# Patient Record
Sex: FEMALE | Race: WHITE
[De-identification: names, ages, dates, MRNs, and addresses within clinical notes are randomized per-mention and may not be internally consistent; named-entity substitution may affect disease eponyms.]

---

## 2018-02-12 ENCOUNTER — HOSPITAL ENCOUNTER (EMERGENCY)
Dept: HOSPITAL 45 - C.EDB | Age: 18
Discharge: HOME | End: 2018-02-12
Payer: COMMERCIAL

## 2018-02-12 VITALS
HEIGHT: 65.98 IN | WEIGHT: 186.95 LBS | BODY MASS INDEX: 30.05 KG/M2 | HEIGHT: 65.98 IN | WEIGHT: 186.95 LBS | BODY MASS INDEX: 30.05 KG/M2

## 2018-02-12 VITALS — HEART RATE: 79 BPM | DIASTOLIC BLOOD PRESSURE: 73 MMHG | SYSTOLIC BLOOD PRESSURE: 95 MMHG | OXYGEN SATURATION: 96 %

## 2018-02-12 VITALS — TEMPERATURE: 98.42 F

## 2018-02-12 DIAGNOSIS — M79.672: Primary | ICD-10-CM

## 2018-02-12 DIAGNOSIS — R60.0: ICD-10-CM

## 2018-02-12 DIAGNOSIS — R63.1: ICD-10-CM

## 2018-02-12 LAB
ALBUMIN SERPL-MCNC: 4.2 GM/DL (ref 3.4–5)
ALP SERPL-CCNC: 67 U/L (ref 45–117)
ALT SERPL-CCNC: 17 U/L (ref 12–78)
AST SERPL-CCNC: 10 U/L (ref 15–37)
BASOPHILS # BLD: 0.03 K/UL (ref 0–0.2)
BASOPHILS NFR BLD: 0.3 %
BUN SERPL-MCNC: 10 MG/DL (ref 7–18)
CALCIUM SERPL-MCNC: 9.6 MG/DL (ref 8.5–10.1)
CO2 SERPL-SCNC: 27 MMOL/L (ref 21–32)
CREAT SERPL-MCNC: 0.69 MG/DL (ref 0.6–1.2)
EOS ABS #: 0.12 K/UL (ref 0–0.5)
EOSINOPHIL NFR BLD AUTO: 197 K/UL (ref 130–400)
GLUCOSE SERPL-MCNC: 83 MG/DL (ref 70–99)
HCT VFR BLD CALC: 40.6 % (ref 37–47)
HGB BLD-MCNC: 14 G/DL (ref 12–16)
IG#: 0.01 K/UL (ref 0–0.02)
IMM GRANULOCYTES NFR BLD AUTO: 17 %
INR PPP: 1 (ref 0.9–1.1)
LYMPHOCYTES # BLD: 1.49 K/UL (ref 1.2–3.4)
MCH RBC QN AUTO: 28.5 PG (ref 25–34)
MCHC RBC AUTO-ENTMCNC: 34.5 G/DL (ref 32–36)
MCV RBC AUTO: 82.7 FL (ref 80–100)
MONO ABS #: 0.59 K/UL (ref 0.11–0.59)
MONOCYTES NFR BLD: 6.7 %
NEUT ABS #: 6.55 K/UL (ref 1.4–6.5)
NEUTROPHILS # BLD AUTO: 1.4 %
NEUTROPHILS NFR BLD AUTO: 74.5 %
PMV BLD AUTO: 10.7 FL (ref 7.4–10.4)
POTASSIUM SERPL-SCNC: 3.7 MMOL/L (ref 3.5–5.1)
PROT SERPL-MCNC: 8 GM/DL (ref 6.4–8.2)
PTT PATIENT: 28.7 SECONDS (ref 21–31)
RED CELL DISTRIBUTION WIDTH CV: 12.5 % (ref 11.5–14.5)
RED CELL DISTRIBUTION WIDTH SD: 37.6 FL (ref 36.4–46.3)
SODIUM SERPL-SCNC: 138 MMOL/L (ref 136–145)
WBC # BLD AUTO: 8.79 K/UL (ref 4.8–10.8)

## 2018-02-12 NOTE — DIAGNOSTIC IMAGING REPORT
L-SPINE MIN 4 VIEWS ROUTINE



CLINICAL HISTORY: 17 years-old Female presenting with LEFT LEFT PAIN, LIMPING. 



TECHNIQUE: Frontal, bilateral oblique, lateral, and coned in lateral views of

the lumbar spine were obtained. 



COMPARISON: CT from 7/11/2016.



FINDINGS:

No scoliosis. Normal lumbar lordosis. No pars defects. No degenerative change.

Vertebral bodies maintain normal height and alignment. Intervertebral disc

spaces preserved. Sacroiliac joints normal. Hip joints grossly normal.



Calcification projecting over right lower quadrant may be intraluminal,

indeterminate.



IMPRESSION:

Normal lumbar spine.







Electronically signed by:  Pino Barbosa M.D.

2/12/2018 1:41 PM



Dictated Date/Time:  2/12/2018 1:34 PM

## 2018-02-12 NOTE — DIAGNOSTIC IMAGING REPORT
L FOOT MIN 3 VIEWS ROUTINE



CLINICAL HISTORY: LEFT EVAL PAIN X SEVERAL MONTHS pain



COMPARISON: None.



DISCUSSION: The bones and joint spaces appear intact. There is no evidence of

fracture, dislocation or bony disease. There is no evidence for soft tissue

swelling.



IMPRESSION: Negative study.











The above report was generated using voice recognition software.  It may contain

grammatical, syntax or spelling errors.







Electronically signed by:  Cayetano León M.D.

2/12/2018 1:35 PM



Dictated Date/Time:  2/12/2018 1:34 PM

## 2018-02-12 NOTE — EMERGENCY ROOM VISIT NOTE
History


First contact with patient:  10:27


Chief Complaint:  ILLNESS


Stated Complaint:  MOUTH DRY, FOOT SWELLING





History of Present Illness


Patient is an otherwise healthy 17-year-old [facility] female brought to the 

emergency department by her mother for evaluation of 2 complaints.  First 

complaint is of left lower extremity pain times several months.  She fell on 

her left side last July, injuring her left foot.  She was unable to walk on the 

leg, and began ambulating on her left toes.  The pain is worse in the morning, 

and with weightbearing.  She states that the pain now radiates up her entire 

leg and into her left hip and back.  She has been seeing a massage therapist 

who has been applying tape to her foot, and when her foot and back are taped, 

it does help with her pain and she feels like she can ambulate more normally.  

At its worst she rates the pain an 8/10, and describes it as sharp in nature.  

Mother also notes that there has been some associated swelling and numbness of 

the foot.  She denies any weakness.  She denies any symptoms on the left leg.





Secondly, the patient has had worsening dry mouth symptoms for about a week.  

She reportedly only drinks about 3 glasses of water per day.  She was trying to 

drink other liquids including Gatorade and Pedialyte to help with her thirst 

but they were no longer effective.  She reports increased urination, and 

reports that she will eat any food that she can get her hands on.  She reports 

associated generalized weakness and fatigue as well.  Mother was concerned that 

she could be dehydrated.  The patient is otherwise healthy without any chronic 

medical problems.  Menses are regular, and had been more painful recently.  

Mother states that the patient actually vomited with her cramps last time.  Her 

last menstrual period was about 4 weeks ago.





Review of Systems


Review of systems as per HPI.  All other systems reviewed were negative.  10 

systems reviewed.





Past Medical/Surgical History


Medical Problems:


(1) Abdominal pain


(2) Nausea and vomiting


(3) No pertinent past medical history


Electronic medical records are reviewed and summarized as above/below.  See 

Problem List.





Family History





No pertinent family history





Social History


Smoking Status:  Never Smoker


Alcohol Use:  none


Marital Status:  single


Housing Status:  lives with family


Occupation Status:  student





Current/Historical Medications


No Active Prescriptions or Reported Meds





Physical Exam


Vital Signs











  Date Time  Temp Pulse Resp B/P (MAP) Pulse Ox O2 Delivery O2 Flow Rate FiO2


 


2/12/18 14:31  79 16 95/73 96 Room Air  


 


2/12/18 13:00  75 16 102/58 97 Room Air  


 


2/12/18 11:27  66 12 111/63 98 Room Air  


 


2/12/18 10:00 36.9 86 18 112/68 96 Room Air  











Physical Exam


CONSTITUTIONAL: Patient is a well-appearing 17-year-old [facility] female who is 

awake and alert and in no acute distress.  Her mother is at the bedside.


EYES:  Pupils equal, round, reactive to light and accommodation.  EOMs intact 

without nystagmus.  Sclera are anicteric. 


ENT:  Tympanic membranes intact, with normal landmarks.  External canals are 

clear.  Oral and nasopharynx are clear.  Mucous membranes are moist, no lesions

, tongue and gums appear normal.     


NECK: No bruits auscultated.  Supple without lymphadenopathy.  No thyromegaly.  

No meningeal signs.  Full active range of motion without discomfort.


CARDIOVASCULAR: Regular rate and rhythm, with normal S1 and S2, no murmur or 

gallop or rub is heard.  No carotid bruits auscultated.  No JVD.  Peripheral 

pulses easy to palpable.


RESPIRATORY: Breath sounds equal and clear to auscultation without wheezes, 

rales, or rhonchi heard.   Full and equal chest expansion without accessory 

muscle use or retractions. 


GI: Bowel sounds are present.  Abdomen is soft, nontender, nondistended.  No 

organomegaly.  No pulsatile masses.  No guarding or rebound.


MUSCULOSKELETAL: Full range of motion of extremities x 4 with good strength.  

No cyanosis, edema, joint tenderness or swelling.  No deformity.  She has 

physio tape noted on the lumbar spine and on the plantar aspect of the left 

foot.  She has tenderness to palpation of the plantar aspect of the left foot.  

Patient and relates with an antalgic gait.


INTEGUMENTARY: No lesions or rash, normal skin turgor. 


NEUROLOGICAL: Alert, oriented, and cooperative.  Cranial nerves, sensation and 

strength grossly intact.  Mcfadden Ray DTRs are equal and symmetrical bilaterally.


LYMPH: No lymphadenopathy.





Medical Decision & Procedures


ER Provider


Diagnostic Interpretation:


L VENOUS DOPP LOWER EXT UNILAT





CLINICAL HISTORY: 17 years-old Female presenting with EVAL DVT. 





TECHNIQUE: Real-time grayscale and color and spectral Doppler ultrasound imaging


of the veins of the left lower extremity was performed. Compression and


augmentation were also utilized.





COMPARISON: None.





FINDINGS: 





Left:


Common femoral vein: Patent.


Greater saphenous vein: Patent.


Deep femoral vein: Patent.


Femoral vein: Patent.


Popliteal vein: Patent.


Calf veins: Patent.





Other: None.





IMPRESSION:


No evidence of deep venous thrombosis.











L FOOT MIN 3 VIEWS ROUTINE





CLINICAL HISTORY: LEFT EVAL PAIN X SEVERAL MONTHS pain





COMPARISON: None.





DISCUSSION: The bones and joint spaces appear intact. There is no evidence of


fracture, dislocation or bony disease. There is no evidence for soft tissue


swelling.





IMPRESSION: Negative study.











L-SPINE MIN 4 VIEWS ROUTINE





CLINICAL HISTORY: 17 years-old Female presenting with LEFT LEFT PAIN, LIMPING. 





TECHNIQUE: Frontal, bilateral oblique, lateral, and coned in lateral views of


the lumbar spine were obtained. 





COMPARISON: CT from 7/11/2016.





FINDINGS:


No scoliosis. Normal lumbar lordosis. No pars defects. No degenerative change.


Vertebral bodies maintain normal height and alignment. Intervertebral disc


spaces preserved. Sacroiliac joints normal. Hip joints grossly normal.





Calcification projecting over right lower quadrant may be intraluminal,


indeterminate.





IMPRESSION:


Normal lumbar spine.





Laboratory Results


2/12/18 12:05








Red Blood Count 4.91, Mean Corpuscular Volume 82.7, Mean Corpuscular Hemoglobin 

28.5, Mean Corpuscular Hemoglobin Concent 34.5, Mean Platelet Volume 10.7, 

Neutrophils (%) (Auto) 74.5, Lymphocytes (%) (Auto) 17.0, Monocytes (%) (Auto) 

6.7, Eosinophils (%) (Auto) 1.4, Basophils (%) (Auto) 0.3, Neutrophils # (Auto) 

6.55, Lymphocytes # (Auto) 1.49, Monocytes # (Auto) 0.59, Eosinophils # (Auto) 

0.12, Basophils # (Auto) 0.03





2/12/18 12:05

















Test


  2/12/18


10:36 2/12/18


12:05


 


Urine Pregnancy Test NEG (NEG)  


 


White Blood Count


  


  8.79 K/uL


(4.8-10.8)


 


Red Blood Count


  


  4.91 M/uL


(4.2-5.4)


 


Hemoglobin


  


  14.0 g/dL


(12.0-16.0)


 


Hematocrit  40.6 % (37-47) 


 


Mean Corpuscular Volume


  


  82.7 fL


()


 


Mean Corpuscular Hemoglobin


  


  28.5 pg


(25-34)


 


Mean Corpuscular Hemoglobin


Concent 


  34.5 g/dl


(32-36)


 


Platelet Count


  


  197 K/uL


(130-400)


 


Mean Platelet Volume


  


  10.7 fL


(7.4-10.4)


 


Neutrophils (%) (Auto)  74.5 % 


 


Lymphocytes (%) (Auto)  17.0 % 


 


Monocytes (%) (Auto)  6.7 % 


 


Eosinophils (%) (Auto)  1.4 % 


 


Basophils (%) (Auto)  0.3 % 


 


Neutrophils # (Auto)


  


  6.55 K/uL


(1.4-6.5)


 


Lymphocytes # (Auto)


  


  1.49 K/uL


(1.2-3.4)


 


Monocytes # (Auto)


  


  0.59 K/uL


(0.11-0.59)


 


Eosinophils # (Auto)


  


  0.12 K/uL


(0-0.5)


 


Basophils # (Auto)


  


  0.03 K/uL


(0-0.2)


 


RDW Standard Deviation


  


  37.6 fL


(36.4-46.3)


 


RDW Coefficient of Variation


  


  12.5 %


(11.5-14.5)


 


Immature Granulocyte % (Auto)  0.1 % 


 


Immature Granulocyte # (Auto)


  


  0.01 K/uL


(0.00-0.02)


 


Erythrocyte Sedimentation Rate


  


  13 mm/hr


(0-21)


 


Prothrombin Time


  


  10.7 SECONDS


(9.0-12.0)


 


Prothromb Time International


Ratio 


  1.0 (0.9-1.1) 


 


 


Activated Partial


Thromboplast Time 


  28.7 SECONDS


(21.0-31.0)


 


Partial Thromboplastin Ratio  1.1 


 


Anion Gap


  


  8.0 mmol/L


(3-11)


 


Est Creatinine Clear Calc


Drug Dose 


  143.8 ml/min 


 


 


Estimated GFR (


American) 


  146.3 


 


 


Estimated GFR (Non-


American 


  126.2 


 


 


BUN/Creatinine Ratio  14.1 (10-20) 


 


Calcium Level


  


  9.6 mg/dl


(8.5-10.1)


 


Magnesium Level


  


  2.4 mg/dl


(1.8-2.4)


 


Total Bilirubin


  


  0.5 mg/dl


(0.2-1)


 


Aspartate Amino Transf


(AST/SGOT) 


  10 U/L (15-37) 


 


 


Alanine Aminotransferase


(ALT/SGPT) 


  17 U/L (12-78) 


 


 


Alkaline Phosphatase


  


  67 U/L


()


 


C-Reactive Protein


  


  < 0.29 mg/dl


(0-0.29)


 


Total Protein


  


  8.0 gm/dl


(6.4-8.2)


 


Albumin


  


  4.2 gm/dl


(3.4-5.0)


 


Globulin


  


  3.8 gm/dl


(2.5-4.0)


 


Albumin/Globulin Ratio  1.1 (0.9-2) 


 


Thyroid Stimulating Hormone


(TSH) 


  0.942 uIu/ml


(0.300-4.500)


 


Lyme Disease IgG Antibody  NEG (NEG) 


 


Lyme Disease IgM Antibody  NEG (NEG) 











ED Course


The patient was seen and evaluated as above.  Her old records are reviewed.  

She presents to the emergency department for evaluation primarily of a several 

month history of left lower extremity pain radiating into the past and low back

, also has been reported some dry mouth and increased thirst.  She is 

reportedly otherwise healthy.  The patient provided a urine sample which was 

dipped and was completely clear.  Pregnancy test was negative.





IV lock was initiated and laboratory studies were collected including CBC with 

differential sedimentation rate, CRP, coags, CMP, TSH, magnesium level and Lyme 

titer.  Left lower extremity ultrasound was obtained and was negative for DVT.  

Lumbar spine and left foot x-rays were obtained, with no evidence for acute 

fracture or bony abnormality.  





The patient's laboratory studies are unremarkable.  White count is not 

elevated.  She is not anemic.  No thrombocytopenia.  She does not have any 

elevation of her inflammatory markers.  Electrolytes are without significant 

abnormality.  Renal function is normal.  We'll go is not elevated.  Liver 

functions are normal.  TSH is indicative of a euthyroid state.  Patient's Lyme 

titer is negative.





Patient's laboratory and diagnostic imaging studies were reviewed with her and 

her mother and they were given a copy of the laboratory studies and diagnostic 

imaging studies.  





The etiology of the the patient's leg pain is unclear at this time.  It appears 

that it started with a mechanical fall, which has significantly altered her gait

, and she does not appear to have normalized this despite the injury being 

several months ago.  She does not have any neurologic or musculoskeletal 

defects on exam, other than ambulating with an antalgic gait.  Differential 

diagnoses entertained included a lumbar radiculopathy, DVT, foot sprain, foot 

fracture, plantar fasciitis, among others.  With regards to the dry mouth, 

increased thirst, weakness and fatigue, in addition to increased urination and 

increased appetite, diabetes was considered, but appears unlikely given her 

normal labs today.  Other type of genetic, endocrine or metabolic abnormality 

was also entertained, but there is no evidence for that by her basic blood work 

today.  Other possibilities including rheumatologic process were also 

considered.  Discussed with the patient and her mother that there was no clear 

source of her symptoms through the ED workup today, but they were encouraged to 

seek further care and evaluation if the symptoms are not improving.  She may 

need more formal evaluation of her symptoms.  The patient and her mother's 

understanding of this and were agreeable.  The patient was discharged home with 

her family in stable condition.





Medical Decision


See ED Course.





Medication Reconcilliation


Current Medication List:  was personally reviewed by me





Blood Pressure Screening


Patient's blood pressure:  Normal blood pressure


Blood pressure disposition:  Did not require urgent referral





Impression





 Primary Impression:  


 Left foot pain


 Additional Impressions:  


 Left leg swelling


 Polydipsia





Departure Information


Prescriptions





No Active Prescriptions or Reported Meds





Referrals


No Doctor, Assigned (PCP)





Patient Instructions


My [facility]





Additional Instructions





Continue current medications.





Activity as tolerated.





Follow up with your primary physician for further care and evaluation.





Problem Qualifiers

## 2018-02-12 NOTE — DIAGNOSTIC IMAGING REPORT
L VENOUS DOPP LOWER EXT UNILAT



CLINICAL HISTORY: 17 years-old Female presenting with EVAL DVT. 



TECHNIQUE: Real-time grayscale and color and spectral Doppler ultrasound imaging

of the veins of the left lower extremity was performed. Compression and

augmentation were also utilized.



COMPARISON: None.



FINDINGS: 



Left:

Common femoral vein: Patent.

Greater saphenous vein: Patent.

Deep femoral vein: Patent.

Femoral vein: Patent.

Popliteal vein: Patent.

Calf veins: Patent.



Other: None.



IMPRESSION:

No evidence of deep venous thrombosis.







Electronically signed by:  Pino Barbosa M.D.

2/12/2018 2:24 PM



Dictated Date/Time:  2/12/2018 2:23 PM